# Patient Record
Sex: MALE | Race: WHITE | URBAN - METROPOLITAN AREA
[De-identification: names, ages, dates, MRNs, and addresses within clinical notes are randomized per-mention and may not be internally consistent; named-entity substitution may affect disease eponyms.]

---

## 2020-06-24 ENCOUNTER — NEW PATIENT (OUTPATIENT)
Dept: URBAN - METROPOLITAN AREA CLINIC 51 | Facility: CLINIC | Age: 50
End: 2020-06-24
Payer: COMMERCIAL

## 2020-06-24 DIAGNOSIS — G43.B0 OPHTHALMOPLEGIC MIGRAINE, NOT INTRACTABLE: Primary | ICD-10-CM

## 2020-06-24 PROCEDURE — 92134 CPTRZ OPH DX IMG PST SGM RTA: CPT | Performed by: OPTOMETRIST

## 2020-06-24 PROCEDURE — 92004 COMPRE OPH EXAM NEW PT 1/>: CPT | Performed by: OPTOMETRIST

## 2020-06-24 ASSESSMENT — INTRAOCULAR PRESSURE: OS: 12

## 2021-04-29 ENCOUNTER — OFFICE VISIT (OUTPATIENT)
Dept: URBAN - METROPOLITAN AREA CLINIC 51 | Facility: CLINIC | Age: 51
End: 2021-04-29
Payer: COMMERCIAL

## 2021-04-29 DIAGNOSIS — H10.011 ACUTE FOLLICULAR CONJUNCTIVITIS, RIGHT EYE: ICD-10-CM

## 2021-04-29 DIAGNOSIS — H54.40 BLINDNESS, ONE EYE, UNSPECIFIED EYE: Primary | ICD-10-CM

## 2021-04-29 PROCEDURE — 99214 OFFICE O/P EST MOD 30 MIN: CPT | Performed by: STUDENT IN AN ORGANIZED HEALTH CARE EDUCATION/TRAINING PROGRAM

## 2021-04-29 RX ORDER — OFLOXACIN 3 MG/ML
0.3 % SOLUTION/ DROPS OPHTHALMIC
Qty: 5 | Refills: 1 | Status: INACTIVE
Start: 2021-04-29 | End: 2021-05-11

## 2021-04-29 RX ORDER — AMOXICILLIN AND CLAVULANATE POTASSIUM 875; 125 MG/1; 1/1
TABLET, FILM COATED ORAL
Qty: 20 | Refills: 0 | Status: INACTIVE
Start: 2021-04-29 | End: 2021-05-11

## 2021-04-29 ASSESSMENT — INTRAOCULAR PRESSURE: OS: 20

## 2021-04-29 ASSESSMENT — KERATOMETRY: OS: 41.59

## 2021-04-29 ASSESSMENT — VISUAL ACUITY: OS: 20/20

## 2021-04-29 NOTE — IMPRESSION/PLAN
Impression: Acute follicular conjunctivitis, right eye: H10.011. Plan: Patient educated on findings, Augmentin BID x 10 days Ofloxacin QID OD x 10 days RTC if s/s worsen, referred to [facility] next available for evaluation

## 2021-04-29 NOTE — IMPRESSION/PLAN
Impression: Blindness, one eye, unspecified eye: H54.40. Plan: Longstanding, monitor. Patient interested in consult with [facility] referred for evaluation.

## 2021-05-11 ENCOUNTER — OFFICE VISIT (OUTPATIENT)
Dept: URBAN - METROPOLITAN AREA CLINIC 51 | Facility: CLINIC | Age: 51
End: 2021-05-11
Payer: COMMERCIAL

## 2021-05-11 PROCEDURE — 92285 EXTERNAL OCULAR PHOTOGRAPHY: CPT | Performed by: OPHTHALMOLOGY

## 2021-05-11 PROCEDURE — 99203 OFFICE O/P NEW LOW 30 MIN: CPT | Performed by: OPHTHALMOLOGY

## 2021-05-11 RX ORDER — NEOMYCIN SULFATE, POLYMYXIN B SULFATE AND DEXAMETHASONE 3.5; 10000; 1 MG/G; [USP'U]/G; MG/G
OINTMENT OPHTHALMIC
Qty: 1 | Refills: 6 | Status: ACTIVE
Start: 2021-05-11

## 2021-05-11 NOTE — IMPRESSION/PLAN
Impression: Acute follicular conjunctivitis, right eye: H10.011. Plan: discharge improving. No implant exposure. Okay to re-insert prosthesis. I do not recommend any operative intervention at this time. Please RTC if worsening of discharge/other conerning symptoms. Okay to f/u Dr. Rafal Estrella for regular eye care and  as scheduled. stop oflox. Start maxitrol jani qhs 3x per week for 3-4 weeks.

## 2022-11-18 ENCOUNTER — OFFICE VISIT (OUTPATIENT)
Dept: URBAN - METROPOLITAN AREA CLINIC 56 | Facility: LOCATION | Age: 52
End: 2022-11-18
Payer: COMMERCIAL

## 2022-11-18 DIAGNOSIS — H43.812 VITREOUS DEGENERATION, LEFT EYE: ICD-10-CM

## 2022-11-18 DIAGNOSIS — H10.011 ACUTE FOLLICULAR CONJUNCTIVITIS, RIGHT EYE: Primary | ICD-10-CM

## 2022-11-18 DIAGNOSIS — H54.40 BLINDNESS, ONE EYE, UNSPECIFIED EYE: ICD-10-CM

## 2022-11-18 PROCEDURE — 99214 OFFICE O/P EST MOD 30 MIN: CPT | Performed by: STUDENT IN AN ORGANIZED HEALTH CARE EDUCATION/TRAINING PROGRAM

## 2022-11-18 PROCEDURE — 92134 CPTRZ OPH DX IMG PST SGM RTA: CPT | Performed by: STUDENT IN AN ORGANIZED HEALTH CARE EDUCATION/TRAINING PROGRAM

## 2022-11-18 RX ORDER — AMOXICILLIN AND CLAVULANATE POTASSIUM 875; 125 MG/1; MG/1
TABLET, FILM COATED ORAL
Qty: 20 | Refills: 0 | Status: ACTIVE
Start: 2022-11-18

## 2022-11-18 RX ORDER — NEOMYCIN SULFATE, POLYMYXIN B SULFATE AND DEXAMETHASONE 3.5; 10000; 1 MG/G; [USP'U]/G; MG/G
OINTMENT OPHTHALMIC
Qty: 1 | Refills: 1 | Status: ACTIVE
Start: 2022-11-18

## 2022-11-18 ASSESSMENT — INTRAOCULAR PRESSURE: OS: 16

## 2022-11-18 ASSESSMENT — KERATOMETRY: OS: 41.65

## 2022-11-18 ASSESSMENT — VISUAL ACUITY: OS: 20/20

## 2022-11-18 NOTE — IMPRESSION/PLAN
Impression: Acute follicular conjunctivitis, right eye: H10.011. Plan: 2nd occurrence. Recommend remove prosthetic thru the weekend. Call if symptoms worsen Start maxitrol jani BID BID thru the weekend, then QD x 1 week and QOD x 1 week Start Augment 875mg PO BID x 10 days

## 2022-11-18 NOTE — IMPRESSION/PLAN
Impression: Blindness, one eye, unspecified eye: H54.40. Plan: last seen  in [place] 4 mos ago, prosthetic was polished and cleaned and fit was good. No changes made.  Pt removes and cleans every 4-6 weeks